# Patient Record
Sex: FEMALE | Race: WHITE | NOT HISPANIC OR LATINO | URBAN - METROPOLITAN AREA
[De-identification: names, ages, dates, MRNs, and addresses within clinical notes are randomized per-mention and may not be internally consistent; named-entity substitution may affect disease eponyms.]

---

## 2019-03-20 ENCOUNTER — HOSPITAL ENCOUNTER (OUTPATIENT)
Dept: OTHER | Facility: HOSPITAL | Age: 44
Discharge: HOME | End: 2019-03-20
Attending: PHYSICIAN ASSISTANT

## 2020-08-31 ENCOUNTER — CONVERSION ENCOUNTER (OUTPATIENT)
Dept: INTERNAL MEDICINE | Facility: CLINIC | Age: 45
End: 2020-08-31

## 2020-08-31 ENCOUNTER — OFFICE VISIT CONVERTED (OUTPATIENT)
Dept: INTERNAL MEDICINE | Facility: CLINIC | Age: 45
End: 2020-08-31
Attending: PHYSICIAN ASSISTANT

## 2020-10-05 ENCOUNTER — HOSPITAL ENCOUNTER (OUTPATIENT)
Dept: URGENT CARE | Facility: CLINIC | Age: 45
Discharge: HOME | End: 2020-10-05
Attending: FAMILY MEDICINE

## 2020-10-08 LAB — SARS-COV-2 RNA SPEC QL NAA+PROBE: NOT DETECTED

## 2020-10-09 ENCOUNTER — OFFICE VISIT CONVERTED (OUTPATIENT)
Dept: INTERNAL MEDICINE | Facility: CLINIC | Age: 45
End: 2020-10-09
Attending: NURSE PRACTITIONER

## 2020-10-09 ENCOUNTER — CONVERSION ENCOUNTER (OUTPATIENT)
Dept: INTERNAL MEDICINE | Facility: CLINIC | Age: 45
End: 2020-10-09

## 2020-10-16 ENCOUNTER — OFFICE VISIT CONVERTED (OUTPATIENT)
Dept: INTERNAL MEDICINE | Facility: CLINIC | Age: 45
End: 2020-10-16
Attending: INTERNAL MEDICINE

## 2020-11-02 ENCOUNTER — HOSPITAL ENCOUNTER (OUTPATIENT)
Dept: OTHER | Facility: HOSPITAL | Age: 45
Discharge: HOME | End: 2020-11-02
Attending: INTERNAL MEDICINE

## 2020-12-04 ENCOUNTER — OFFICE VISIT CONVERTED (OUTPATIENT)
Dept: CARDIOLOGY | Facility: CLINIC | Age: 45
End: 2020-12-04
Attending: SPECIALIST

## 2020-12-21 ENCOUNTER — CONVERSION ENCOUNTER (OUTPATIENT)
Dept: CARDIOLOGY | Facility: CLINIC | Age: 45
End: 2020-12-21
Attending: SPECIALIST

## 2021-03-19 ENCOUNTER — HOSPITAL ENCOUNTER (OUTPATIENT)
Dept: PREADMISSION TESTING | Facility: HOSPITAL | Age: 46
Discharge: HOME | End: 2021-03-19
Attending: OBSTETRICS & GYNECOLOGY

## 2021-03-19 LAB — SARS-COV-2 RNA SPEC QL NAA+PROBE: NOT DETECTED

## 2021-03-24 ENCOUNTER — HOSPITAL ENCOUNTER (OUTPATIENT)
Dept: PERIOP | Facility: HOSPITAL | Age: 46
Setting detail: HOSPITAL OUTPATIENT SURGERY
Discharge: HOME | End: 2021-03-24
Attending: OBSTETRICS & GYNECOLOGY

## 2021-03-24 LAB
ABO GROUP BLD: NORMAL
ANION GAP SERPL CALC-SCNC: 12 MMOL/L (ref 8–19)
BASOPHILS # BLD AUTO: 0.11 10*3/UL (ref 0–0.2)
BASOPHILS NFR BLD AUTO: 1.1 % (ref 0–3)
BLD GP AB SCN SERPL QL: NORMAL
BUN SERPL-MCNC: 12 MG/DL (ref 5–25)
BUN/CREAT SERPL: 16 {RATIO} (ref 6–20)
CALCIUM SERPL-MCNC: 9.3 MG/DL (ref 8.7–10.4)
CHLORIDE SERPL-SCNC: 105 MMOL/L (ref 99–111)
CONV ABD CONTROL: NORMAL
CONV ABS IMM GRAN: 0.03 10*3/UL (ref 0–0.2)
CONV CO2: 24 MMOL/L (ref 22–32)
CONV IMMATURE GRAN: 0.3 % (ref 0–1.8)
CREAT UR-MCNC: 0.77 MG/DL (ref 0.5–0.9)
DEPRECATED RDW RBC AUTO: 47.4 FL (ref 36.4–46.3)
EOSINOPHIL # BLD AUTO: 1.49 10*3/UL (ref 0–0.7)
EOSINOPHIL # BLD AUTO: 14.3 % (ref 0–7)
ERYTHROCYTE [DISTWIDTH] IN BLOOD BY AUTOMATED COUNT: 13.5 % (ref 11.7–14.4)
GFR SERPLBLD BASED ON 1.73 SQ M-ARVRAT: >60 ML/MIN/{1.73_M2}
GLUCOSE SERPL-MCNC: 97 MG/DL (ref 65–99)
HCG INTACT+B SERPL-ACNC: <0.5 M[IU]/ML (ref 0–5)
HCT VFR BLD AUTO: 38.6 % (ref 37–47)
HGB BLD-MCNC: 12.4 G/DL (ref 12–16)
LYMPHOCYTES # BLD AUTO: 4.5 10*3/UL (ref 1–5)
LYMPHOCYTES NFR BLD AUTO: 43.2 % (ref 20–45)
MCH RBC QN AUTO: 30.2 PG (ref 27–31)
MCHC RBC AUTO-ENTMCNC: 32.1 G/DL (ref 33–37)
MCV RBC AUTO: 94.1 FL (ref 81–99)
MONOCYTES # BLD AUTO: 0.91 10*3/UL (ref 0.2–1.2)
MONOCYTES NFR BLD AUTO: 8.7 % (ref 3–10)
NEUTROPHILS # BLD AUTO: 3.38 10*3/UL (ref 2–8)
NEUTROPHILS NFR BLD AUTO: 32.4 % (ref 30–85)
NRBC CBCN: 0 % (ref 0–0.7)
OSMOLALITY SERPL CALC.SUM OF ELEC: 284 MOSM/KG (ref 273–304)
PLATELET # BLD AUTO: 286 10*3/UL (ref 130–400)
PMV BLD AUTO: 10.5 FL (ref 9.4–12.3)
POTASSIUM SERPL-SCNC: 4.4 MMOL/L (ref 3.5–5.3)
RBC # BLD AUTO: 4.1 10*6/UL (ref 4.2–5.4)
RH BLD: NORMAL
SODIUM SERPL-SCNC: 137 MMOL/L (ref 135–147)
WBC # BLD AUTO: 10.42 10*3/UL (ref 4.8–10.8)

## 2021-05-10 NOTE — PROCEDURES
"   Procedure Note      Patient Name: Jenniffer Buchanan   Patient ID: [id number]   Sex: Female   YOB: 1975    Primary Care Provider: Griselda Grace PA-C   Referring Provider: Griselda Grace PA-C    Visit Date: December 21, 2020    Provider: Benjamin Cisneros MD   Location: [facility]   Location Address: [address] [place] [address], [address]   [place], [place]  [id number]   Location Phone: [phone]          FINAL REPORT   TRANSTHORACIC ECHOCARDIOGRAM REPORT    Diagnosis: Atrial septal defect.   Height: 5'6\" Weight: 200 B/P: 96/70 BSA: 2.0   Tech: JTW   MEASUREMENTS:  RVID (Diastole) : RVID. (NORMAL: 0.7 to 2.4 cm max)   LVID (Systole): 3.7 cm (Diastole): 5.0 cm . (NORMAL: 3.7 - 5.4 cm)   Posterior Wall Thickness (Diastole): 1.1 cm. (NORMAL: 0.8 - 1.1 cm)   Septal Thickness (Diastole): 1.0 cm. (NORMAL: 0.7 - 1.2 cm)   LAID (Systole): 3.1 cm. (NORMAL: 1.9 - 3.8 cm)   Aortic Root Diameter (Diastole): 3.3 cm. (NORMAL: 2.0 - 3.7 cm)   DOPPLER:  E/A ratio 0.9 (NORMAL 0.8-2.0)   DT: 143 msec (NORMAL 140-240 msec.)   IVRT 88 m/sec (NORMAL  m/sec.)   E/E': 7 (NORMAL <8 avg.)   COMMENTS:  The patient underwent 2-D, M-Mode, and Doppler examination, including pulse-wave, continuous-wave, and color-flow analysis; the study is technically adequate.   FINDINGS:  AORTIC VALVE: Normal. Tricuspid in appearance with normal central closure.   MITRAL VALVE: Fibrotic.   TRICUSPID VALVE: Normal.   PULMONIC VALVE: Not well visualized.   LEFT ATRIUM: Normal. No intracavitary masses or clots seen. LA volume index is 13 mL/m2.   AORTIC ROOT: Normal in size with adequate motion.   LEFT VENTRICLE: Normal left ventricular systolic function. Ejection fraction 50%.   RIGHT ATRIUM: There is an ASD closure device in the interatrial septum.   RIGHT VENTRICLE: Normal size and function.   PERICARDIUM: Unremarkable. No evidence of effusion.   INFERIOR VENA CAVA: Diameter is 1.7 cm.   DOPPLER: Doppler examination of the aortic, mitral, " tricuspid, and pulmonary valves was performed. Normal pulmonary artery systolic pressure by Doppler. There is trace mitral regurgitation. No evidence of right-to-left shunt pm bubble study.   Faxed: 01/04/2021      CONCLUSION:  1.  Adequate left ventricular systolic function.  2.  No evidence of right-to-left shunt.   3.  ASD closure device noted.  4.  Trace mitral regurgitation.      Benjamin Cisneros MD  IRASEMA/pap                 Electronically Signed by: Yuly Song-, Other -Author on January 4, 2021 01:14:59 PM  Electronically Co-signed by: Benjamin Cisneros MD -Reviewer on January 11, 2021 12:24:37 PM

## 2021-05-10 NOTE — H&P
History and Physical      Patient Name: Jenniffer Buchanan   Patient ID: [id number]   Sex: Female   YOB: 1975    Primary Care Provider: Nataliia Coulter MD   Referring Provider: Griselda Grace PA-C    Visit Date: December 4, 2020    Provider: Benjamin Cisneros MD   [facility]   Location Address: [address] [place] [address], [address]   [place], [place]  [id number]   Location Phone: [phone]          Chief Complaint     Preoperative risk stratification.    History of ASD repair.       History Of Present Illness  Consult requested by: Griselda Grace PA-C   Jenniffer Buchanan is a 45 year old /White female with no chest pain or shortness of breath. She is supposed to undergo a D&C. She has a history of an ASD status post closure of the ASD about 10 years ago. She has had no symptoms since then. No syncopal or presyncopal episodes. No palpitations. Cardiac risk factors: No history of hypertension or diabetes mellitus. Nonsmoker. No history of hyperlipidemia.   PAST MEDICAL HISTORY: Atrial septal defect; Migraines.   FAMILY HISTORY: Positive for diabetes mellitus and hypertension. Negative for heart disease.   PSYCHOSOCIAL HISTORY: Denies tobacco use. Denies alcohol use. Daily caffeine intake. .   CURRENT MEDICATIONS: None.   ALLERGIES: Codeine sulfate.   CHOLESTEROL STATUS: Unknown.       Review of Systems  · Constitutional  o Admits  o : good general health lately  o Denies  o : fatigue, recent weight changes   · Eyes  o Denies  o : double vision  · HENT  o Denies  o : hearing loss or ringing, chronic sinus problem, swollen glands in neck  · Cardiovascular  o Denies  o : chest pain, palpitations (fast, fluttering, or skipping beats), swelling (feet, ankles, hands), shortness of breath while walking or lying flat  · Respiratory  o Admits  o : COPD  o Denies  o : asthma or wheezing  · Gastrointestinal  o Denies  o : ulcers, nausea or vomiting  · Neurologic  o Admits  o : stroke,  "headaches  o Denies  o : lightheaded or dizzy  · Musculoskeletal  o Denies  o : joint pain, back pain  · Endocrine  o Denies  o : thyroid disease, diabetes, heat or cold intolerance, excessive thirst or urination  · Heme-Lymph  o Denies  o : bleeding or bruising tendency, anemia      Vitals  Date Time BP Position Site L\R Cuff Size HR RR TEMP (F) WT  HT  BMI kg/m2 BSA m2 O2 Sat FR L/min FiO2 HC       12/04/2020 10:23 AM 96/70 Sitting    84 - R   200lbs 0oz 5'  6\" 32.28 2.06             Physical Examination  · Constitutional  o Appearance  o : Awake, alert, cooperative, pleasant.  · Eyes  o Pupils and Irises  o : Pupils equal and reacting to light and accommodation.  · Ears, Nose, Mouth and Throat  o Ears  o : Tympanic membranes are normal.  o Nose  o : Clear with no maxillary tenderness.  o Oral Cavity  o : Clear.  · Neck  o Inspection/Palpation  o : No JVD, bruits, thyromegaly, or adenopathy. Trachea midline. No axillary or cervical lymphadenopathy.  o Thyroid  o : No thyroid enlargement.   · Respiratory  o Inspection of Chest  o : No chest wall deformities, moving equal.  o Auscultation of Lungs  o : Good air entry with vesicular breath sounds.  o Percussion of Chest  o : Resonant bilaterally.   o Palpation of Chest  o : Equal movements bilaterally.  · Cardiovascular  o Heart  o :   § Auscultation of Heart  § : PMI is in the 5th intercostal space. S1 and S2 regular. No S3. No S4.   o Peripheral Vascular System  o :   § Extremities  § : No pedal edema. Peripheral pulses well felt. No cyanosis.  · Gastrointestinal  o Abdominal Examination  o : No organomegaly, masses, tenderness, bruits, or abnormal pulsations. Bowel sounds are normal.  · Musculoskeletal  o General  o : Muscle strength is normal with normal tone.  · Skin and Subcutaneous Tissue  o General Inspection  o : No rash, petechiae, or suspicious skin lesions. Skin turgor is normal.  · EKG  o EKG  o : Performed in the office today.  o Indications  o : " Preoperative risk stratification.   o Results  o : Sinus rhythm, within normal limits.           Assessment     ASSESSMENT & PLAN:     History of ASD status post percutaneous closure.  Repeat echocardiogram to evaluate her ASD prior to her surgery.             Electronically Signed by: Ita Rose-, Other -Author on December 8, 2020 11:47:28 AM  Electronically Co-signed by: Benjamin Cisneros MD -Reviewer on December 16, 2020 10:47:33 AM

## 2021-05-10 NOTE — H&P
History and Physical      Patient Name: Jenniffer Buchanan   Patient ID: [id number]   Sex: Female   YOB: 1975        Visit Date: August 31, 2020    Provider: Griselda Grace PA-C   Location: [facility]   Location Address: [address] [place] [address], [address]  [place], [place]  [id number]   Location Phone: [phone]          Chief Complaint  · est care   · surgery clearance      History Of Present Illness  Jenniffer Buchanan is a 45 year old /White female who presents for evaluation and treatment of:      est care  previous pcp: Dr. Hauser in [facility]. Hasn't been seen in over a year.   last labs: 7/2020  Last pap: 7/2020  last mammo: 7/2020 through GYN  tobacco: former smoker [id number] years. Quit in 2019    Abnormal bleeding and pain: uterine polyp removal surgery.  She states she just had a lot of bloodwork done at Dr. Orona office.     Pt referred from GYN for pre-op clearance. She needs a uterine polyp removed. Dr. Orona plans to do D&C, hysteroscopy, removal of polyp and endometrial ablation requiring general anesthesia. She has hx of CVA and cardiac surgery    CVA caused by hole in heart. HELEX in place 2008.  She saw cardiology 1 time after surgery and was cleared.   Denies cp, palpitations, jaw/shoulder pain, dizziness, ha.                Past Medical History  Disease Name Date Onset Notes   CVA (cerebral infarction) --  --    Heart murmur --  --    History of atrial septal defect 08/31/2020 Discussed need to see cardiology to get surgical clearance. Referral placed today.    History of stroke 08/31/2020 --    Migraines --  --          Past Surgical History  Procedure Name Date Notes   Back surgery 2017 ruptured disc   heart surgery 2008 stroke/hole in heart- ASD repaired with HELEX device   Tubal ligation 1996 --          Allergy List  Allergen Name Date Reaction Notes   Codeine Sulfate --  --  --        Allergies Reconciled  Social History  Finding Status Start/Stop  "Quantity Notes   Alcohol Never --/-- --  --     --  --/-- --  --    Tobacco Former --/-- --  1 ppd [id number]. quit 2019         Review of Systems  · Constitutional  o Denies  o : fatigue, fever, weight gain, weight loss, chills  · Eyes  o Denies  o : changes in vision  · HENT  o Denies  o : ear pain, sore throat  · Cardiovascular  o Denies  o : chest Pain, palpitations, edema (swelling)  · Respiratory  o Denies  o : frequent cough, shortness of breath  · Gastrointestinal  o Denies  o : nausea, vomiting, changes in bowel habits  · Genitourinary  o Denies  o : dysuria, urinary frequency, urinary urgency, polyuria  · Integument  o Denies  o : rash  · Neurologic  o Denies  o : headache, tingling or numbness, dizziness  · Musculoskeletal  o Denies  o : joint pain, myalgias  · Endocrine  o Denies  o : polydipsia, polyphagia  · Psychiatric  o Denies  o : mood changes, memory changes, SI/HI  · Heme-Lymph  o Denies  o : easy bruising, easy bleeding, lymph node enlargement or tenderness  · Allergic-Immunologic  o Denies  o : eczema, seasonal allergies, urticaria      Vitals  Date Time BP Position Site L\R Cuff Size HR RR TEMP (F) WT  HT  BMI kg/m2 BSA m2 O2 Sat        08/31/2020 02:21 /70 Sitting    69 - R 15 97.7 195lbs 16oz 5'  6\" 31.63 2.03 98 %          Physical Examination  · Constitutional  o Appearance  o : no acute distress, well-nourished  · Head and Face  o Head  o :   § Inspection  § : atraumatic, normocephalic  · Eyes  o Eyes  o : extraocular movements intact, no scleral icterus, no conjunctival injection  · Ears, Nose, Mouth and Throat  o Ears  o :   § External Ears  § : normal  o Nose  o :   § Intranasal Exam  § : nares patent  o Oral Cavity  o :   § Oral Mucosa  § : moist mucous membranes  · Neck  o Thyroid  o : gland size normal, nontender, no nodules or masses present on palpation, symmetric  · Respiratory  o Respiratory Effort  o : breathing comfortably, symmetric chest rise  o Auscultation of " Lungs  o : clear to asculatation bilaterally, no wheezes, rales, or rhonchii  · Cardiovascular  o Heart  o :   § Auscultation of Heart  § : regular rate and rhythm, no murmurs, rubs, or gallops  o Peripheral Vascular System  o :   § Extremities  § : no edema  · Gastrointestinal  o Abdominal Examination  o :   § Abdomen  § : bowel sounds present, non-distended, non-tender  · Skin and Subcutaneous Tissue  o General Inspection  o : no lesions present, no areas of discoloration, skin turgor normal  · Neurologic  o Mental Status Examination  o :   § Orientation  § : grossly oriented to person, place and time  o Gait and Station  o :   § Gait Screening  § : normal gait  · Psychiatric  o General  o : normal mood and affect              Assessment  · Screening for depression     V79.0/Z13.89  negative  · History of atrial septal defect     V12.59/Z86.79  Discussed need to see cardiology to get surgical clearance. Referral placed today.   · History of stroke     V12.54/Z86.73  · Abnormal uterine bleeding     626.9/N93.9  Will clear for surgery after pt gets clearance from cardiology  · Uterine polyp     621.0/N84.0      Plan  · Orders  o ACO-39: Current medications updated and reviewed () - - 08/31/2020  o CARDIOLOGY CONSULTATION (CARDI) - V12.59/Z86.79, V12.54/Z86.73 - 08/31/2020   Needs surgical clearance for Dr. Orona  · Medications  o Medications have been Reconciled  o Transition of Care or Provider Policy  · Instructions  o Depression Screen completed and scanned into the EMR under the designated folder within the patient's documents.  o Today's PHQ-9 result is 0  o Patient was educated/instructed on their diagnosis, treatment and medications prior to discharge from the clinic today.  · Disposition  o Call or Return if symptoms worsen or persist.  o Follow up in 1 year  o Care Transition  o SOFI Sent            Electronically Signed by: Griselda Grace PA-C -Author on August 31, 2020 03:45:42 PM

## 2021-05-13 NOTE — PROGRESS NOTES
Progress Note      Patient Name: Jenniffer Buchanan   Patient ID: [id number]   Sex: Female   YOB: 1975    Primary Care Provider: Griselda Grace PA-C   Referring Provider: Griselda Grace PA-C    Visit Date: October 9, 2020    Provider: RICHARD LOAIZA   Location: [facility]   Location Address: [address] [place] [address], [address]  [place], [place]  [id number]   Location Phone: [phone]          Chief Complaint  · Acute visit      History Of Present Illness  Jenniffer Buchanan is a 45 year old /White female who presents for evaluation and treatment of:      Acute visit    Patient reports that she was seen on [facility] on 10/5/2020 diagnosed with left lower lobe pneumonia patient was also tested for covid in needing a return to work form.  Patient reports she did began getting sick last Tuesday with fever cough congestion him was seen at [facility] this Monday.  Chest x-ray revealed pneumonia.  T-max was 101.4 F.  No fever since Wednesday.  Denies nausea vomiting, abdominal pain, headache  She is currently taking Mucinex, Z-Mike, Augmentin, Tessalon Perles and finishing prednisone.  Patient reports the mucus is coming out and she is feeling a little bit better today but continues to have shortness of breath and cough.  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         Past Medical History  Disease Name Date Onset Notes   CVA (cerebral infarction) --  --    Heart murmur --  --    History of atrial septal defect 08/31/2020 Discussed need to see cardiology to get surgical clearance. Referral placed today.    History of stroke 08/31/2020 --    Migraines --  --          Past Surgical History  Procedure Name Date Notes   Back surgery 2017 ruptured disc   heart surgery 2008 stroke/hole in heart- ASD repaired with HELEX device   Tubal ligation 1996 --          Allergy List  Allergen Name Date Reaction Notes   Codeine Sulfate --  --  --        Allergies  "Reconciled  Family Medical History  Disease Name Relative/Age Notes   CVA (Cerebrovascular accident) Father/  Grandfather (maternal)/   --    Diabetes Father/  Mother/   --          Social History  Finding Status Start/Stop Quantity Notes   Alcohol Never --/-- --  --     --  --/-- --  --    Tobacco Former --/-- --  1 ppd [id number]. quit 2019         Review of Systems  · Constitutional  o Admits  o : fever, fatigue  o Denies  o : weight loss, weight gain  · HENT  o Admits  o : nasal congestion, nasal discharge  o Denies  o : headaches, sinus pain, sore throat, ear pain, ear fullness  · Cardiovascular  o Denies  o : lower extremity edema, claudication, chest pressure, palpitations  · Respiratory  o Admits  o : shortness of breath, wheezing, cough, dyspnea on exertion  o Denies  o : hemoptysis  · Gastrointestinal  o Denies  o : nausea, vomiting, diarrhea, constipation, abdominal pain  · Genitourinary  o Denies  o : urgency, frequency, dysuria, hematuria  · Integument  o Denies  o : rash      Vitals  Date Time BP Position Site L\R Cuff Size HR RR TEMP (F) WT  HT  BMI kg/m2 BSA m2 O2 Sat FR L/min FiO2 HC       08/31/2020 02:21 /70 Sitting    69 - R 15 97.7 195lbs 16oz 5'  6\" 31.63 2.03 98 %      10/09/2020 10:22 /86 Sitting    85 - R 18 98 194lbs 8oz 5'  6\" 31.39 2.03 95 %  21%          Physical Examination  · Constitutional  o Appearance  o : no acute distress, well-nourished  · Head and Face  o Head  o :   § Inspection  § : atraumatic, normocephalic  · Eyes  o Eyes  o : extraocular movements intact, no scleral icterus, no conjunctival injection  · Ears, Nose, Mouth and Throat  o Ears  o :   § External Ears  § : normal  § Otoscopic Examination  § : tympanic membrane appearance within normal limits bilaterally  o Nose  o :   § Intranasal Exam  § : nares patent  o Oral Cavity  o :   § Oral Mucosa  § : moist mucous membranes  o Throat  o :   § Oropharynx  § : no inflammation or lesions present, tonsils " within normal limits  · Respiratory  o Respiratory Effort  o : breathing comfortably, symmetric chest rise  o Auscultation of Lungs  o : rhonchi present throughout  · Cardiovascular  o Heart  o :   § Auscultation of Heart  § : regular rate and rhythm, no murmurs, rubs, or gallops  o Peripheral Vascular System  o :   § Extremities  § : no edema  · Gastrointestinal  o Abdominal Examination  o :   § Abdomen  § : bowel sounds present, non-distended, non-tender  · Lymphatic  o Neck  o : no lymphadenopathy present  o Supraclavicular Nodes  o : no supraclavicular nodes  · Skin and Subcutaneous Tissue  o General Inspection  o : no lesions present, no areas of discoloration, skin turgor normal  · Neurologic  o Mental Status Examination  o :   § Orientation  § : grossly oriented to person, place and time  o Gait and Station  o :   § Gait Screening  § : normal gait  · Psychiatric  o General  o : normal mood and affect          Assessment  · Pneumonia of left lower lobe due to infectious organism     486/J18.9  Patient to continue Mucinex, Z-Mike, Augmentin, Tessalon Perles at this time. We will follow-up in 1 week regarding shortness of breath and continued cough. Would recommend a follow-up chest x-ray in 2 weeks to make that 1 month out from her original chest x-ray. Patient to call with any new or worsening symptoms. Work note provided for the following week.    Problems Reconciled  Plan  · Orders  o ACO-39: Current medications updated and reviewed (, 1159F) - - 10/09/2020  · Medications  o Medications have been Reconciled  o Transition of Care or Provider Policy  · Instructions  o Rest. Increase Fluids.  o Patient was educated/instructed on their diagnosis, treatment and medications prior to discharge from the clinic today.  o Call the office with any concerns or questions.  o Discussed Covid-19 precautions including, but not limited to, social distancing, avoid touching your face, and hand washing.    · Disposition  o Call or Return if symptoms worsen or persist.  o 1 week follow up            Electronically Signed by: LEWIS RALPH APRN -Author on October 9, 2020 10:50:27 AM

## 2021-05-13 NOTE — PROGRESS NOTES
Progress Note      Patient Name: Jenniffer Buchanan   Patient ID: [id number]   Sex: Female   YOB: 1975    Primary Care Provider: Griselda Grace PA-C   Referring Provider: Griselda Grace PA-C    Visit Date: October 16, 2020    Provider: Nataliia Coulter MD   Location: [facility]   Location Address: [address] [place] [address], [address]  [place], [place]  [id number]   Location Phone: [phone]          Chief Complaint  · f/u pneumonia       History Of Present Illness  Jenniffer Buchanan is a 45 year old /White female who presents for evaluation and treatment of:      Patient was seen here a week ago for pneumonia. States she still has trouble breathing and a slight cough.  Cough has improved from last week though.  States that she had previously been on a controller medicine for her COPD but is currently just on albuterol.  Denies fever or other systemic symptoms.      Patient states due to the antibiotics she believes she may have a yeast infection and thrush.       Past Medical History  Disease Name Date Onset Notes   CVA (cerebral infarction) --  --    Heart murmur --  --    History of atrial septal defect 08/31/2020 Discussed need to see cardiology to get surgical clearance. Referral placed today.    History of stroke 08/31/2020 --    Migraines --  --          Past Surgical History  Procedure Name Date Notes   Back surgery 2017 ruptured disc   heart surgery 2008 stroke/hole in heart- ASD repaired with HELEX device   Tubal ligation 1996 --          Medication List  Name Date Started Instructions   guaifenesin 200 mg oral tablet  take 1 tablet (200 mg) by oral route every 4 hours as needed         Allergy List  Allergen Name Date Reaction Notes   Codeine Sulfate --  --  --          Family Medical History  Disease Name Relative/Age Notes   CVA (Cerebrovascular accident) Father/  Grandfather (maternal)/   --    Diabetes Father/  Mother/   --          Social History  Finding Status  "Start/Stop Quantity Notes   Alcohol Never --/-- --  --     --  --/-- --  --    Tobacco Former --/-- --  1 ppd [id number]. quit 2019         Review of Systems  · Constitutional  o Denies  o : fever, fatigue, weight loss, weight gain  · HENT  o Admits  o : oral lesions  · Cardiovascular  o Denies  o : lower extremity edema, claudication, chest pressure, palpitations  · Respiratory  o Admits  o : shortness of breath, cough  o Denies  o : wheezing, hemoptysis, dyspnea on exertion  · Gastrointestinal  o Denies  o : nausea, vomiting, diarrhea, constipation, abdominal pain  · Genitourinary  o Admits  o : vaginal discharge      Vitals  Date Time BP Position Site L\R Cuff Size HR RR TEMP (F) WT  HT  BMI kg/m2 BSA m2 O2 Sat FR L/min FiO2 HC       10/16/2020 09:35 /72 Sitting    91 - R  98.1 200lbs 0oz 5'  6\" 32.28 2.06 97 %  21%          Physical Examination  · Constitutional  o Appearance  o : no acute distress, well-nourished  · Head and Face  o Head  o :   § Inspection  § : atraumatic, normocephalic  · Ears, Nose, Mouth and Throat  o Ears  o :   § External Ears  § : normal  o Nose  o :   § Intranasal Exam  § : nares patent  o Oral Cavity  o :   § Oral Mucosa  § : moist mucous membranes  · Respiratory  o Respiratory Effort  o : breathing comfortably, symmetric chest rise  o Auscultation of Lungs  o : Diffuse wheezing throughout, no rhonchi  · Cardiovascular  o Heart  o :   § Auscultation of Heart  § : regular rate and rhythm, no murmurs, rubs, or gallops  o Peripheral Vascular System  o :   § Extremities  § : no edema  · Neurologic  o Mental Status Examination  o :   § Orientation  § : grossly oriented to person, place and time  o Gait and Station  o :   § Gait Screening  § : normal gait          Assessment  · Pneumonia     486/J18.9  Symptoms improving somewhat from last visit  Will prescribe Combivent rather than albuterol that she has using  Will order chest x-ray to be performed in 1 week, which would be 1 " month from her original imaging.  · Oral candidiasis     112.0/B37.0  · Vaginal candidiasis     112.1/B37.3  We will treat with Diflucan    Problems Reconciled  Plan  · Orders  o ACO-39: Current medications updated and reviewed (1159F, ) - - 10/16/2020  o CXR PA/Lat [facility] ([id number]) - 486/J18.9 - 10/23/2020   follow-up resolution of pneumonia  · Medications  o Diflucan 200 mg oral tablet   SIG: take one tab po once and then 3 days later than take another pill   DISP: (2) Tablet with 0 refills  Prescribed on 10/16/2020     o Combivent Respimat  mcg/actuation inhalation mist   SIG: inhale 1 puff by inhalation route 4 times per day ; may take additional puffs as needed not to exceed 6 puffs in 24hrs   DISP: (1) Inhaler with 0 refills  Prescribed on 10/16/2020     o Medications have been Reconciled  · Instructions  o Patient was educated/instructed on their diagnosis, treatment and medications prior to discharge from the clinic today.  o Patient instructed to seek medical attention urgently for new or worsening symptoms.  o Call the office with any concerns or questions.  · Disposition  o Call or Return if symptoms worsen or persist.  o follow up as needed  o Radiology order to be printed            Electronically Signed by: Natailia Coulter MD -Author on October 16, 2020 10:32:45 AM

## 2021-05-14 VITALS
OXYGEN SATURATION: 98 % | SYSTOLIC BLOOD PRESSURE: 110 MMHG | HEIGHT: 66 IN | DIASTOLIC BLOOD PRESSURE: 70 MMHG | RESPIRATION RATE: 15 BRPM | BODY MASS INDEX: 31.5 KG/M2 | WEIGHT: 196 LBS | HEART RATE: 69 BPM | TEMPERATURE: 97.7 F

## 2021-05-14 VITALS
OXYGEN SATURATION: 97 % | TEMPERATURE: 98.1 F | WEIGHT: 200 LBS | HEIGHT: 66 IN | BODY MASS INDEX: 32.14 KG/M2 | HEART RATE: 91 BPM | SYSTOLIC BLOOD PRESSURE: 120 MMHG | DIASTOLIC BLOOD PRESSURE: 72 MMHG

## 2021-05-14 VITALS
WEIGHT: 200 LBS | BODY MASS INDEX: 32.14 KG/M2 | DIASTOLIC BLOOD PRESSURE: 70 MMHG | SYSTOLIC BLOOD PRESSURE: 96 MMHG | HEIGHT: 66 IN | HEART RATE: 84 BPM

## 2021-05-14 VITALS
HEIGHT: 66 IN | DIASTOLIC BLOOD PRESSURE: 86 MMHG | OXYGEN SATURATION: 95 % | TEMPERATURE: 98 F | HEART RATE: 85 BPM | WEIGHT: 194.5 LBS | SYSTOLIC BLOOD PRESSURE: 124 MMHG | BODY MASS INDEX: 31.26 KG/M2 | RESPIRATION RATE: 18 BRPM

## 2023-06-09 PROCEDURE — 87077 CULTURE AEROBIC IDENTIFY: CPT

## 2023-06-09 PROCEDURE — 87086 URINE CULTURE/COLONY COUNT: CPT

## 2023-06-09 PROCEDURE — 87660 TRICHOMONAS VAGIN DIR PROBE: CPT

## 2023-06-09 PROCEDURE — 87186 SC STD MICRODIL/AGAR DIL: CPT

## 2023-06-09 PROCEDURE — 87510 GARDNER VAG DNA DIR PROBE: CPT

## 2023-06-09 PROCEDURE — 87480 CANDIDA DNA DIR PROBE: CPT

## 2023-06-10 ENCOUNTER — TELEPHONE (OUTPATIENT)
Dept: URGENT CARE | Facility: CLINIC | Age: 48
End: 2023-06-10
Payer: COMMERCIAL

## 2023-06-10 NOTE — TELEPHONE ENCOUNTER
Called, patient confirmed her date of birth, relayed results from vaginal swab, positive for Gardnerella vaginalis which causes bacterial vaginosis, as well as trichomonas vaginalis, negative for Candida species. Patient states she picked up the metronidazole, and is taking as prescribed, recommended to continue taking as prescribed, and if she has any persistent symptoms to follow-up with her primary care provider. Patient expresses understanding.     -John Cooksey, PA-C

## 2023-06-12 ENCOUNTER — TELEPHONE (OUTPATIENT)
Dept: URGENT CARE | Facility: CLINIC | Age: 48
End: 2023-06-12
Payer: COMMERCIAL

## 2023-06-12 NOTE — TELEPHONE ENCOUNTER
I spoke with patient who verified her name and date of birth. Reviewed all lab results. Patient is positive for BV and trichamonas and has been prescribed flagyl to treat those diagnosis. Informed patient that her urine culture was positive but nitrofurantoin would not work and called her in cefdinir to her pharmacy. Patient also request to have a diflucan called in in case she developed a yeast infection. Diflucan also sent to pharmacy. Patient verbalizes understanding of conversation and has no further questions or concerns.

## 2023-06-17 PROCEDURE — 87660 TRICHOMONAS VAGIN DIR PROBE: CPT | Performed by: NURSE PRACTITIONER

## 2023-06-17 PROCEDURE — 87480 CANDIDA DNA DIR PROBE: CPT | Performed by: NURSE PRACTITIONER

## 2023-06-17 PROCEDURE — 87510 GARDNER VAG DNA DIR PROBE: CPT | Performed by: NURSE PRACTITIONER

## 2023-06-18 ENCOUNTER — TELEPHONE (OUTPATIENT)
Dept: URGENT CARE | Facility: CLINIC | Age: 48
End: 2023-06-18
Payer: COMMERCIAL

## 2023-06-18 DIAGNOSIS — B96.89 BV (BACTERIAL VAGINOSIS): Primary | ICD-10-CM

## 2023-06-18 DIAGNOSIS — N76.0 BV (BACTERIAL VAGINOSIS): Primary | ICD-10-CM

## 2023-06-18 RX ORDER — CLINDAMYCIN HYDROCHLORIDE 300 MG/1
300 CAPSULE ORAL 2 TIMES DAILY
Qty: 14 CAPSULE | Refills: 0 | Status: SHIPPED | OUTPATIENT
Start: 2023-06-18 | End: 2023-06-25

## 2023-06-18 NOTE — TELEPHONE ENCOUNTER
Patient called back to the office, test results reviewed, patient is still testing positive for BV, will do a round of clindamycin.  Recommend follow-up with PCP or GYN if symptoms worsen or persist.  Patient verbalizes understanding.

## 2023-08-27 PROCEDURE — 87510 GARDNER VAG DNA DIR PROBE: CPT | Performed by: NURSE PRACTITIONER

## 2023-08-27 PROCEDURE — 87480 CANDIDA DNA DIR PROBE: CPT | Performed by: NURSE PRACTITIONER

## 2023-08-27 PROCEDURE — 87660 TRICHOMONAS VAGIN DIR PROBE: CPT | Performed by: NURSE PRACTITIONER

## 2023-08-27 PROCEDURE — 87591 N.GONORRHOEAE DNA AMP PROB: CPT | Performed by: NURSE PRACTITIONER

## 2023-08-27 PROCEDURE — 87491 CHLMYD TRACH DNA AMP PROBE: CPT | Performed by: NURSE PRACTITIONER

## 2023-08-28 ENCOUNTER — TELEPHONE (OUTPATIENT)
Dept: URGENT CARE | Facility: CLINIC | Age: 48
End: 2023-08-28
Payer: COMMERCIAL

## 2023-08-28 NOTE — TELEPHONE ENCOUNTER
----- Message from RICHARD Hough sent at 8/28/2023  9:25 AM EDT -----  Please notify patient of negative vaginal screen results and negative gonorrhea and Chlamydia test results.